# Patient Record
Sex: FEMALE | Race: WHITE | NOT HISPANIC OR LATINO | Employment: UNEMPLOYED | URBAN - METROPOLITAN AREA
[De-identification: names, ages, dates, MRNs, and addresses within clinical notes are randomized per-mention and may not be internally consistent; named-entity substitution may affect disease eponyms.]

---

## 2023-07-14 LAB — ALDOSTERONE IN SERUM: 5.9 NG/DL

## 2023-07-17 LAB
CORTISOL FREE, SERUM: 0.39 UG/DL
METANEPHRINE: 0.31 NMOL/L
NORMETANEPHRINE: 2 NMOL/L

## 2023-07-27 LAB — RENIN ACTIVITY: 11.1 NG/ML/HR

## 2023-09-02 PROBLEM — F41.9 ANXIETY DISORDER: Status: ACTIVE | Noted: 2023-09-02

## 2023-09-02 PROBLEM — G89.29 NECK PAIN, CHRONIC: Status: ACTIVE | Noted: 2023-09-02

## 2023-09-02 PROBLEM — G89.29 CHRONIC RIGHT SHOULDER PAIN: Status: ACTIVE | Noted: 2023-09-02

## 2023-09-02 PROBLEM — M48.00 SPINAL STENOSIS: Status: ACTIVE | Noted: 2023-09-02

## 2023-09-02 PROBLEM — M51.379 DEGENERATION OF LUMBOSACRAL INTERVERTEBRAL DISC: Status: ACTIVE | Noted: 2023-09-02

## 2023-09-02 PROBLEM — M79.10 MYALGIA: Status: ACTIVE | Noted: 2023-09-02

## 2023-09-02 PROBLEM — D35.00 ADRENAL ADENOMA: Status: ACTIVE | Noted: 2023-09-02

## 2023-09-02 PROBLEM — M51.37 DEGENERATION OF LUMBOSACRAL INTERVERTEBRAL DISC: Status: ACTIVE | Noted: 2023-09-02

## 2023-09-02 PROBLEM — M54.2 NECK PAIN, CHRONIC: Status: ACTIVE | Noted: 2023-09-02

## 2023-09-02 PROBLEM — I50.23 ACUTE ON CHRONIC LEFT SYSTOLIC HEART FAILURE (MULTI): Status: ACTIVE | Noted: 2023-09-02

## 2023-09-02 PROBLEM — E55.9 AVITAMINOSIS D: Status: ACTIVE | Noted: 2023-09-02

## 2023-09-02 PROBLEM — K59.09 CHRONIC CONSTIPATION: Status: ACTIVE | Noted: 2023-09-02

## 2023-09-02 PROBLEM — R00.2 PALPITATIONS: Status: ACTIVE | Noted: 2023-09-02

## 2023-09-02 PROBLEM — M25.512 CHRONIC LEFT SHOULDER PAIN: Status: ACTIVE | Noted: 2023-09-02

## 2023-09-02 PROBLEM — I10 BENIGN ESSENTIAL HYPERTENSION: Status: ACTIVE | Noted: 2023-09-02

## 2023-09-02 PROBLEM — I42.9 CARDIOMYOPATHY (MULTI): Status: ACTIVE | Noted: 2023-09-02

## 2023-09-02 PROBLEM — R79.89 ELEVATED PLASMA METANEPHRINES: Status: ACTIVE | Noted: 2023-09-02

## 2023-09-02 PROBLEM — M54.16 LUMBAR RADICULOPATHY: Status: ACTIVE | Noted: 2023-09-02

## 2023-09-02 PROBLEM — N93.8 DYSFUNCTIONAL UTERINE BLEEDING: Status: ACTIVE | Noted: 2023-09-02

## 2023-09-02 PROBLEM — M25.511 CHRONIC RIGHT SHOULDER PAIN: Status: ACTIVE | Noted: 2023-09-02

## 2023-09-02 PROBLEM — G43.709 MIGRAINE, CHRONIC, WITHOUT AURA: Status: ACTIVE | Noted: 2023-09-02

## 2023-09-02 PROBLEM — G89.29 CHRONIC PAIN: Status: ACTIVE | Noted: 2023-09-02

## 2023-09-02 PROBLEM — G47.30 APNEA, SLEEP: Status: ACTIVE | Noted: 2023-09-02

## 2023-09-02 PROBLEM — G89.29 CHRONIC LEFT SHOULDER PAIN: Status: ACTIVE | Noted: 2023-09-02

## 2023-09-02 PROBLEM — J42 CHRONIC BRONCHITIS (MULTI): Status: ACTIVE | Noted: 2023-09-02

## 2023-09-02 PROBLEM — F32.A DEPRESSION: Status: ACTIVE | Noted: 2023-09-02

## 2023-09-02 PROBLEM — M99.05 SEGMENTAL AND SOMATIC DYSFUNCTION OF PELVIC REGION: Status: ACTIVE | Noted: 2023-09-02

## 2023-09-02 RX ORDER — SACUBITRIL AND VALSARTAN 24; 26 MG/1; MG/1
1 TABLET, FILM COATED ORAL 2 TIMES DAILY
COMMUNITY
Start: 2022-04-19 | End: 2024-02-22 | Stop reason: SDUPTHER

## 2023-09-02 RX ORDER — IBUPROFEN 200 MG
1 TABLET ORAL DAILY
COMMUNITY
Start: 2022-04-21 | End: 2024-02-22 | Stop reason: WASHOUT

## 2023-09-02 RX ORDER — CARVEDILOL 6.25 MG/1
1 TABLET ORAL
COMMUNITY
Start: 2022-04-15 | End: 2024-02-09 | Stop reason: SDUPTHER

## 2023-09-02 RX ORDER — SPIRONOLACTONE 25 MG/1
25 TABLET ORAL 2 TIMES DAILY
COMMUNITY
End: 2024-02-22 | Stop reason: SDUPTHER

## 2023-10-10 ENCOUNTER — APPOINTMENT (OUTPATIENT)
Dept: CARDIOLOGY | Facility: CLINIC | Age: 48
End: 2023-10-10
Payer: COMMERCIAL

## 2023-11-23 PROBLEM — I50.22 CHRONIC SYSTOLIC (CONGESTIVE) HEART FAILURE (MULTI): Status: ACTIVE | Noted: 2023-09-02

## 2023-12-06 ENCOUNTER — HOSPITAL ENCOUNTER (EMERGENCY)
Facility: HOSPITAL | Age: 48
Discharge: LEFT BEFORE DISCHARGE | End: 2023-12-06
Payer: COMMERCIAL

## 2023-12-06 VITALS
HEART RATE: 94 BPM | HEIGHT: 62 IN | SYSTOLIC BLOOD PRESSURE: 111 MMHG | DIASTOLIC BLOOD PRESSURE: 80 MMHG | RESPIRATION RATE: 22 BRPM | WEIGHT: 131.84 LBS | TEMPERATURE: 97.2 F | OXYGEN SATURATION: 97 % | BODY MASS INDEX: 24.26 KG/M2

## 2023-12-06 PROCEDURE — 4500999001 HC ED NO CHARGE

## 2023-12-06 PROCEDURE — 99281 EMR DPT VST MAYX REQ PHY/QHP: CPT

## 2023-12-06 ASSESSMENT — LIFESTYLE VARIABLES
HAVE YOU EVER FELT YOU SHOULD CUT DOWN ON YOUR DRINKING: NO
EVER HAD A DRINK FIRST THING IN THE MORNING TO STEADY YOUR NERVES TO GET RID OF A HANGOVER: NO
REASON UNABLE TO ASSESS: NO
EVER FELT BAD OR GUILTY ABOUT YOUR DRINKING: NO
HAVE PEOPLE ANNOYED YOU BY CRITICIZING YOUR DRINKING: NO

## 2023-12-06 ASSESSMENT — COLUMBIA-SUICIDE SEVERITY RATING SCALE - C-SSRS
1. IN THE PAST MONTH, HAVE YOU WISHED YOU WERE DEAD OR WISHED YOU COULD GO TO SLEEP AND NOT WAKE UP?: NO
6. HAVE YOU EVER DONE ANYTHING, STARTED TO DO ANYTHING, OR PREPARED TO DO ANYTHING TO END YOUR LIFE?: NO
2. HAVE YOU ACTUALLY HAD ANY THOUGHTS OF KILLING YOURSELF?: NO

## 2023-12-06 ASSESSMENT — PAIN SCALES - GENERAL: PAINLEVEL_OUTOF10: 0 - NO PAIN

## 2023-12-06 ASSESSMENT — PAIN - FUNCTIONAL ASSESSMENT: PAIN_FUNCTIONAL_ASSESSMENT: 0-10

## 2023-12-06 NOTE — ED TRIAGE NOTES
Pt to ER with c/o low blood pressure, and feeling like her left leg has water in it. Pt has hx of CHF.

## 2024-02-09 DIAGNOSIS — I42.9 CARDIOMYOPATHY, UNSPECIFIED TYPE (MULTI): Primary | ICD-10-CM

## 2024-02-09 RX ORDER — CARVEDILOL 6.25 MG/1
6.25 TABLET ORAL
Qty: 60 TABLET | Refills: 0 | Status: SHIPPED | OUTPATIENT
Start: 2024-02-09 | End: 2024-02-22 | Stop reason: SDUPTHER

## 2024-02-21 NOTE — PROGRESS NOTES
"Counseling:  The patient was counseled regarding diagnostic results, instructions for management, risk factor reductions, prognosis, patient and family education, impressions, risks and benefits of treatment options and importance of compliance with treatment.      Chief Complaint:   The patient presents today for 7-month followup of heart failure, HTN and syncope.      History Of Present Illness:    Zoila May is a 48 year old female patient who presents today for 7-month followup of heart failure, HTN and syncope. Her PMH is significant for systolic heart failure, anxiety, sleep apnea, depression, tobacco abuse and spinal stenosis. Over the past 7 months, the patient states that she has done relatively well from a cardiac standpoint. She denies any CP or discomfort. She reports exertional SOB, especially with stairs. BP has been stable. The patient is compliant with her prescribed medications. Unfortunately, the patient continues to smoke.      Last Recorded Vitals:  Vitals:    02/22/24 1138   BP: 126/86   Pulse: 105   Weight: 63 kg (139 lb)   Height: 1.575 m (5' 2\")       Past Surgical History:  She has a past surgical history that includes Other surgical history (09/30/2015).      Social History:  She reports that she has been smoking cigarettes. She has been exposed to tobacco smoke. She has never used smokeless tobacco. No history on file for alcohol use and drug use.    Family History:  Family History   Problem Relation Name Age of Onset    Cancer Mother      Cancer Father          Allergies:  Patient has no known allergies.    Outpatient Medications:  Current Outpatient Medications   Medication Instructions    carvedilol (COREG) 6.25 mg, oral, 2 times daily with meals    cyclobenzaprine (FLEXERIL) 10 mg, oral, 3 times daily PRN    empagliflozin (Jardiance) 25 mg 1 tablet, oral, Daily    furosemide (LASIX) 20 mg, oral, Daily    nicotine (Nicoderm CQ) 14 mg/24 hr patch 1 patch, transdermal, Daily, As " directed.     sacubitriL-valsartan (Entresto) 24-26 mg tablet 1 tablet, oral, 2 times daily    spironolactone (ALDACTONE) 25 mg, oral, 2 times daily     Review of Systems   Cardiovascular:  Positive for dyspnea on exertion.   All other systems reviewed and are negative.     Physical Exam:  Constitutional:       Appearance: Healthy appearance. Not in distress.   Neck:      Vascular: No JVR. JVD normal.   Pulmonary:      Effort: Pulmonary effort is normal.      Breath sounds: Normal breath sounds. No wheezing. No rhonchi. No rales.   Chest:      Chest wall: Not tender to palpatation.   Cardiovascular:      PMI at left midclavicular line. Normal rate. Regular rhythm. Normal S1. Normal S2.       Murmurs: There is no murmur.      No gallop.  No click. No rub.   Pulses:     Intact distal pulses.   Edema:     Peripheral edema absent.   Abdominal:      General: Bowel sounds are normal.      Palpations: Abdomen is soft.      Tenderness: There is no abdominal tenderness.   Musculoskeletal: Normal range of motion.         General: No tenderness. Skin:     General: Skin is warm and dry.   Neurological:      General: No focal deficit present.      Mental Status: Alert and oriented to person, place and time.          Last Labs:  CBC -  Lab Results   Component Value Date    WBC 11.8 (H) 06/26/2023    HGB 14.6 06/26/2023    HCT 43.9 06/26/2023    MCV 87 06/26/2023     06/26/2023       CMP -  Lab Results   Component Value Date    CALCIUM 9.1 06/26/2023    PROT 6.8 06/26/2023    ALBUMIN 4.2 06/26/2023    AST 10 06/26/2023    ALT 7 06/26/2023    ALKPHOS 68 06/26/2023    BILITOT 0.2 06/26/2023       RENAL FUNCTION PANEL -   Lab Results   Component Value Date    GLUCOSE 100 (H) 06/26/2023     06/26/2023    K 3.2 (L) 06/26/2023     06/26/2023    CO2 22 06/26/2023    ANIONGAP 12 06/26/2023    BUN 5 (L) 06/26/2023    CREATININE 0.57 06/26/2023    CALCIUM 9.1 06/26/2023    ALBUMIN 4.2 06/26/2023        Lab Results    Component Value Date    BNP 11 06/26/2023       Last Cardiology Tests:  07/11/2023 - Vascular Lab Renal Artery U/S   1. Right Renal Artery: Right renal arteries demonstrate no evidence of hemodynamically significant stenosis. The right renal vein is widely patent.  2. Left Renal Artery: Left renal arteries demonstrate no evidence of hemodynamically significant stenosis. The left renal vein is widely patent.  3. Additional Findings: Technically limited due to bowel gas and patients respirations.     10/06/2022 - TTE  Left ventricular systolic function is normal with a 60-65% estimated ejection fraction.     04/14/2022 - Cardiac Catheterization (LH/RH)  1. Non-obstructive coronary artery disease.  2. Right dominant coronary circulation.  3. Reduced CO/Cardiac index (Cynthia) with normal right and left sided filling pressures.     04/13/2022 - TTE  1. The left ventricular systolic function is severely decreased with a 25% estimated ejection fraction.  2. Spectral Doppler shows an abnormal pattern of left ventricular diastolic filling.  3. The right atrium is mild to moderately dilated.  4. Mild to moderate mitral valve regurgitation.  5. There is moderate to severe tricuspid regurgitation.  6. There is global hypokinesis of the left ventricle with minor regional variations.     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  TTE Oct 2022 with normalization of LVEF 60-65%  [facility] April 2022 with non-obstructive CAD  Advised on adequate daily hydration  Denies CP or discomfort  Reports exertional SOB, especially with stairs  Smoking cessation  Check CXR  Check echo  Check CBC, CMP, BNP     2) HTN  Stable  Continue carvedilol 6.25 mg BID, Entresto 24-26 mg BID, furosemide 20 mg daily,  spironolactone 25 mg daily   ED evaluation 06/25/2023 for elevated home BP of 150s/100s with associated headache  EKG showed NSR at a rate of 95 bpm with no acute changes and BP was measured at 134/92  Renal artery U/S 07/11/2023 negative  Hormonal profile drawn 07/11/2023 with elevated normetanephrine of 2.0, otherwise unremarkable      3) Syncope  Reviewed Holter  Denies recurrent syncope     4) Elevated WBC  CBC drawn 06/2023 with elevated WBC count of 11.8  Patient denies any infectious symptoms  Referral previously placed to Dr. JAXSON Johnson to establish PCP care  New referral placed to establish PCP care - Dr. Mckenzie     5) Tobacco Dependence  Discussed smoking cessation face to face and explained detrimental effects to the patients health including worsening lung disease, HTN and CAD. Educated on benefits on quitting smoking including reducing risk of CAD, lung disease, peptic ulcers, cancer and osteoporosis. Educated on available options to help in quitting including nicotine patch, Nicorette gum/lozenges, Chantix and Wellbutrin. Counseling time spent on smoking cessation was 5 minutes.   -Reports exertional SOB  Check CXR        Scribe Attestation  By signing my name below, I, Ying Murillo   attest that this documentation has been prepared under the direction and in the presence of Gasper Pineda MD.

## 2024-02-22 ENCOUNTER — OFFICE VISIT (OUTPATIENT)
Dept: CARDIOLOGY | Facility: CLINIC | Age: 49
End: 2024-02-22
Payer: COMMERCIAL

## 2024-02-22 VITALS
HEART RATE: 105 BPM | DIASTOLIC BLOOD PRESSURE: 86 MMHG | SYSTOLIC BLOOD PRESSURE: 126 MMHG | HEIGHT: 62 IN | WEIGHT: 139 LBS | BODY MASS INDEX: 25.58 KG/M2

## 2024-02-22 DIAGNOSIS — I42.9 CARDIOMYOPATHY, UNSPECIFIED TYPE (MULTI): ICD-10-CM

## 2024-02-22 DIAGNOSIS — I50.22 CHRONIC SYSTOLIC (CONGESTIVE) HEART FAILURE (MULTI): ICD-10-CM

## 2024-02-22 DIAGNOSIS — I42.0 DILATED CARDIOMYOPATHY (MULTI): Primary | ICD-10-CM

## 2024-02-22 PROCEDURE — 3074F SYST BP LT 130 MM HG: CPT | Performed by: INTERNAL MEDICINE

## 2024-02-22 PROCEDURE — 3079F DIAST BP 80-89 MM HG: CPT | Performed by: INTERNAL MEDICINE

## 2024-02-22 PROCEDURE — 99213 OFFICE O/P EST LOW 20 MIN: CPT | Performed by: INTERNAL MEDICINE

## 2024-02-22 PROCEDURE — 4004F PT TOBACCO SCREEN RCVD TLK: CPT | Performed by: INTERNAL MEDICINE

## 2024-02-22 RX ORDER — CARVEDILOL 6.25 MG/1
6.25 TABLET ORAL
Qty: 180 TABLET | Refills: 3 | Status: SHIPPED | OUTPATIENT
Start: 2024-02-22 | End: 2025-02-21

## 2024-02-22 RX ORDER — SPIRONOLACTONE 25 MG/1
25 TABLET ORAL 2 TIMES DAILY
Qty: 180 TABLET | Refills: 3 | Status: SHIPPED | OUTPATIENT
Start: 2024-02-22 | End: 2025-02-21

## 2024-02-22 RX ORDER — SACUBITRIL AND VALSARTAN 24; 26 MG/1; MG/1
1 TABLET, FILM COATED ORAL 2 TIMES DAILY
Qty: 180 TABLET | Refills: 3 | Status: SHIPPED | OUTPATIENT
Start: 2024-02-22 | End: 2025-02-21

## 2024-02-22 RX ORDER — FUROSEMIDE 20 MG/1
20 TABLET ORAL DAILY
Qty: 90 TABLET | Refills: 3 | Status: SHIPPED | OUTPATIENT
Start: 2024-02-22 | End: 2025-02-21

## 2024-02-22 ASSESSMENT — ENCOUNTER SYMPTOMS: DYSPNEA ON EXERTION: 1

## 2024-02-22 NOTE — PATIENT INSTRUCTIONS
Continue all current medications as prescribed. Refills have been sent to your pharmacy.  Dr. Pineda has ordered a chest x-ray.  Dr. Pineda has also ordered an echocardiogram (ultrasound of the heart) to followup on your heart function and structure.   Please have blood work drawn.  Smoking cessation is strongly encouraged.  A new referral has been placed for you to get established with a primary care provider (Dr. Mckenzie).  Followup with Dr. Pineda after the above tests.    If you have any questions or cardiac concerns, please call our office at [phone].

## 2024-03-06 ENCOUNTER — HOSPITAL ENCOUNTER (OUTPATIENT)
Dept: CARDIOLOGY | Facility: HOSPITAL | Age: 49
Discharge: HOME | End: 2024-03-06
Payer: COMMERCIAL

## 2024-03-06 DIAGNOSIS — I42.0 DILATED CARDIOMYOPATHY (MULTI): ICD-10-CM

## 2024-03-06 DIAGNOSIS — R06.02 SHORTNESS OF BREATH: ICD-10-CM

## 2024-03-06 DIAGNOSIS — I50.23 ACUTE ON CHRONIC SYSTOLIC (CONGESTIVE) HEART FAILURE (MULTI): ICD-10-CM

## 2024-03-06 DIAGNOSIS — I50.22 CHRONIC SYSTOLIC (CONGESTIVE) HEART FAILURE (MULTI): ICD-10-CM

## 2024-03-06 LAB
EJECTION FRACTION APICAL 4 CHAMBER: 51
EJECTION FRACTION: 61 %
LEFT ATRIUM VOLUME AREA LENGTH INDEX BSA: 11.3 ML/M2
LEFT VENTRICLE INTERNAL DIMENSION DIASTOLE: 3.88 CM (ref 3.5–6)
LEFT VENTRICULAR OUTFLOW TRACT DIAMETER: 1.96 CM
MITRAL VALVE E/A RATIO: 0.99
MITRAL VALVE E/E' RATIO: 7.69
RIGHT VENTRICLE FREE WALL PEAK S': 10.88 CM/S
TRICUSPID ANNULAR PLANE SYSTOLIC EXCURSION: 2 CM

## 2024-03-06 PROCEDURE — 93306 TTE W/DOPPLER COMPLETE: CPT | Performed by: INTERNAL MEDICINE

## 2024-03-06 PROCEDURE — 93306 TTE W/DOPPLER COMPLETE: CPT

## 2024-03-14 PROBLEM — M25.559 ARTHRALGIA OF HIP: Status: ACTIVE | Noted: 2024-03-14

## 2024-03-14 PROBLEM — S69.90XA THUMB INJURY: Status: ACTIVE | Noted: 2024-03-14

## 2024-03-14 PROBLEM — E55.9 VITAMIN D DEFICIENCY: Status: ACTIVE | Noted: 2023-09-02

## 2024-03-14 PROBLEM — I50.9 CONGESTIVE HEART FAILURE (MULTI): Status: ACTIVE | Noted: 2022-10-06

## 2024-03-14 PROBLEM — S01.95XA: Status: ACTIVE | Noted: 2024-03-14

## 2024-03-14 PROBLEM — R93.89 ABNORMAL RADIOGRAPHIC EXAMINATION: Status: ACTIVE | Noted: 2024-03-14
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
## 2024-03-14 PROBLEM — M79.10 MUSCLE PAIN: Status: ACTIVE | Noted: 2023-09-02

## 2024-03-14 PROBLEM — R14.0 ABDOMINAL BLOATING: Status: ACTIVE | Noted: 2024-03-14

## 2024-03-14 PROBLEM — M54.9 CHRONIC BACK PAIN: Status: ACTIVE | Noted: 2024-03-14

## 2024-03-14 PROBLEM — G89.29 CHRONIC BACK PAIN: Status: ACTIVE | Noted: 2024-03-14

## 2024-04-02 ENCOUNTER — OFFICE VISIT (OUTPATIENT)
Dept: CARDIOLOGY | Facility: CLINIC | Age: 49
End: 2024-04-02
Payer: COMMERCIAL

## 2024-04-02 VITALS — DIASTOLIC BLOOD PRESSURE: 80 MMHG | HEART RATE: 60 BPM | SYSTOLIC BLOOD PRESSURE: 120 MMHG

## 2024-04-02 DIAGNOSIS — I42.0 DILATED CARDIOMYOPATHY (MULTI): Primary | ICD-10-CM

## 2024-04-02 PROCEDURE — 99213 OFFICE O/P EST LOW 20 MIN: CPT | Performed by: INTERNAL MEDICINE

## 2024-04-02 PROCEDURE — 3079F DIAST BP 80-89 MM HG: CPT | Performed by: INTERNAL MEDICINE

## 2024-04-02 PROCEDURE — 3074F SYST BP LT 130 MM HG: CPT | Performed by: INTERNAL MEDICINE

## 2024-04-02 ASSESSMENT — ENCOUNTER SYMPTOMS: DYSPNEA ON EXERTION: 1

## 2024-04-02 NOTE — PROGRESS NOTES
Counseling:  The patient was counseled regarding diagnostic results, instructions for management, risk factor reductions, prognosis, patient and family education, impressions, risks and benefits of treatment options and importance of compliance with treatment.      Chief Complaint:   The patient presents today for 6-week followup of exertional SOB s/p echocardiogram.     History Of Present Illness:    Zoila May is a 49 year old female patient who presents today for 6-week followup of exertional SOB s/p echocardiogram. Her PMH is significant for systolic heart failure, anxiety, sleep apnea, depression, tobacco abuse and spinal stenosis. Echocardiogram performed 03/06/202 demonstrated an EF of 55-60%. Today, the patient states that she is feeling relatively well with no new cardiac complaints. She unfortunately has not had the CXR or labs done that were ordered at last office visit.      Last Recorded Vitals:  Vitals:    04/02/24 1214   BP: 120/80       Past Surgical History:  She has a past surgical history that includes Other surgical history (09/30/2015).      Social History:  She reports that she has been smoking cigarettes. She has been exposed to tobacco smoke. She has never used smokeless tobacco. No history on file for alcohol use and drug use.    Family History:  Family History   Problem Relation Name Age of Onset    Cancer Mother      Cancer Father          Allergies:  Patient has no known allergies.    Outpatient Medications:  Current Outpatient Medications   Medication Instructions    albuterol (Ventolin HFA) 90 mcg/actuation inhaler inhalation    carvedilol (COREG) 6.25 mg, oral, 2 times daily with meals    cyclobenzaprine (FLEXERIL) 10 mg, oral, 3 times daily PRN    dicyclomine (BENTYL) 10 mg, oral    docusate sodium (COLACE) 100 mg, oral, Every 12 hours    empagliflozin (JARDIANCE) 25 mg, oral, Daily    fluticasone furoate-vilanteroL (Breo Ellipta) 100-25 mcg/dose inhaler inhalation    furosemide  (LASIX) 20 mg, oral, Daily    gabapentin (NEURONTIN) 800 mg, oral    L. acidophilus/Bifid. animalis 31 billion cell capsule oral    meloxicam (MOBIC) 15 mg, oral    naloxone (NARCAN) 1 mg, nasal    sacubitriL-valsartan (Entresto) 24-26 mg tablet 1 tablet, oral, 2 times daily    spironolactone (ALDACTONE) 25 mg, oral, 2 times daily    tiZANidine (ZANAFLEX) 4 mg, oral     Review of Systems   Cardiovascular:  Positive for dyspnea on exertion.   All other systems reviewed and are negative.     Physical Exam:  Constitutional:       Appearance: Healthy appearance. Not in distress.   Neck:      Vascular: No JVR. JVD normal.   Pulmonary:      Effort: Pulmonary effort is normal.      Breath sounds: Normal breath sounds. No wheezing. No rhonchi. No rales.   Chest:      Chest wall: Not tender to palpatation.   Cardiovascular:      PMI at left midclavicular line. Normal rate. Regular rhythm. Normal S1. Normal S2.       Murmurs: There is no murmur.      No gallop.  No click. No rub.   Pulses:     Intact distal pulses.   Edema:     Peripheral edema absent.   Abdominal:      General: Bowel sounds are normal.      Palpations: Abdomen is soft.      Tenderness: There is no abdominal tenderness.   Musculoskeletal: Normal range of motion.         General: No tenderness. Skin:     General: Skin is warm and dry.   Neurological:      General: No focal deficit present.      Mental Status: Alert and oriented to person, place and time.          Last Labs:  CBC -  Lab Results   Component Value Date    WBC 11.8 (H) 06/26/2023    HGB 14.6 06/26/2023    HCT 43.9 06/26/2023    MCV 87 06/26/2023     06/26/2023       CMP -  Lab Results   Component Value Date    CALCIUM 9.1 06/26/2023    PROT 6.8 06/26/2023    ALBUMIN 4.2 06/26/2023    AST 10 06/26/2023    ALT 7 06/26/2023    ALKPHOS 68 06/26/2023    BILITOT 0.2 06/26/2023       RENAL FUNCTION PANEL -   Lab Results   Component Value Date    GLUCOSE 100 (H) 06/26/2023     06/26/2023    K  3.2 (L) 06/26/2023     06/26/2023    CO2 22 06/26/2023    ANIONGAP 12 06/26/2023    BUN 5 (L) 06/26/2023    CREATININE 0.57 06/26/2023    CALCIUM 9.1 06/26/2023    ALBUMIN 4.2 06/26/2023        Lab Results   Component Value Date    BNP 11 06/26/2023       Last Cardiology Tests:  03/06/2024 - TTE  Left ventricular systolic function is normal with a 55-60% estimated ejection fraction.    07/11/2023 - Vascular Lab Renal Artery U/S   1. Right Renal Artery: Right renal arteries demonstrate no evidence of hemodynamically significant stenosis. The right renal vein is widely patent.  2. Left Renal Artery: Left renal arteries demonstrate no evidence of hemodynamically significant stenosis. The left renal vein is widely patent.  3. Additional Findings: Technically limited due to bowel gas and patients respirations.     10/06/2022 - TTE  Left ventricular systolic function is normal with a 60-65% estimated ejection fraction.     04/14/2022 - Cardiac Catheterization (LH/RH)  1. Non-obstructive coronary artery disease.  2. Right dominant coronary circulation.  3. Reduced CO/Cardiac index (Cynthia) with normal right and left sided filling pressures.     04/13/2022 - TTE  1. The left ventricular systolic function is severely decreased with a 25% estimated ejection fraction.  2. Spectral Doppler shows an abnormal pattern of left ventricular diastolic filling.  3. The right atrium is mild to moderately dilated.  4. Mild to moderate mitral valve regurgitation.  5. There is moderate to severe tricuspid regurgitation.  6. There is global hypokinesis of the left ventricle with minor regional variations.     Diagnostic review: I have personally reviewed the result(s) of the Echocardiogram.     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  [facility] April 2022 with non-obstructive CAD  Advised on adequate daily hydration  Denies CP or discomfort  Reports exertional SOB, especially with stairs  Smoking cessation  Check CXR - not completed  TTE 03/06/2024 with LVEF 55-60%  Advised to have CXR and labs done that were ordered at last office visit   Followup with Mary Carlos NP, in 6 months     2) HTN  Stable  Continue carvedilol 6.25 mg BID, Entresto 24-26 mg BID, furosemide 20 mg daily, spironolactone 25 mg daily   ED evaluation 06/25/2023 for elevated home BP of 150s/100s with associated headache  EKG showed NSR at a rate of 95 bpm with no acute changes and BP was measured at 134/92  Renal artery U/S 07/11/2023 negative  Hormonal profile drawn 07/11/2023 with elevated normetanephrine of 2.0, otherwise unremarkable   Followup with Mary Carlos NP, in 6 months     3) Syncope  Reviewed Holter  Denies recurrent syncope     4) Elevated WBC  CBC drawn 06/2023 with elevated WBC count of 11.8  Patient denies any infectious symptoms  Referral previously placed to Dr. JAXSON Johnson to establish PCP care  New referral placed to establish PCP care - Dr. Mckenzie     5) Tobacco Dependence  Discussed smoking cessation face to face and explained detrimental effects to the patients health including worsening lung disease, HTN and CAD. Educated on benefits on quitting smoking including reducing risk of CAD, lung disease, peptic ulcers, cancer and osteoporosis. Educated on available options to help in quitting including nicotine patch, Nicorette gum/lozenges, Chantix and Wellbutrin. Counseling time spent on smoking cessation was 5 minutes.   -Reports exertional SOB  -Check CXR - not completed  -Advised to have CXR done        Scribe Attestation  By signing my name below, I, Ying Murillo   attest that this documentation has been prepared under the direction and in the presence of Gasper Pineda MD.

## 2024-04-02 NOTE — PATIENT INSTRUCTIONS
Continue all current medications as prescribed.   Please have the blood work and chest x-ray done. You will be notified of the results once they become available.  Followup with Mary Carlos NP, in 6 months.      If you have any questions or cardiac concerns, please call our office at [phone].

## 2024-04-17 ENCOUNTER — TELEPHONE (OUTPATIENT)
Dept: CARDIOLOGY | Facility: CLINIC | Age: 49
End: 2024-04-17
Payer: COMMERCIAL

## 2024-04-17 NOTE — TELEPHONE ENCOUNTER
Left Zoila PINK letting her know that her appt scheduled with Dr. Mckenzie was made in error and is canceled. I let her know she could contact me at our office to help her find a primary provider if needed. I will contact her in the AM again if I do not hear from her before leaving this evening.

## 2024-04-18 ENCOUNTER — APPOINTMENT (OUTPATIENT)
Dept: PRIMARY CARE | Facility: CLINIC | Age: 49
End: 2024-04-18
Payer: COMMERCIAL

## 2024-09-26 ASSESSMENT — ENCOUNTER SYMPTOMS
VOMITING: 0
SYNCOPE: 0
SHORTNESS OF BREATH: 0
HEMATURIA: 0
DYSPNEA ON EXERTION: 1
WHEEZING: 0
HEMATOCHEZIA: 0
PALPITATIONS: 0
ALTERED MENTAL STATUS: 0
COUGH: 0
ORTHOPNEA: 0
FEVER: 0
IRREGULAR HEARTBEAT: 0
CHILLS: 0
NAUSEA: 0
NEAR-SYNCOPE: 0

## 2024-09-26 NOTE — PROGRESS NOTES
Chief Complaint/Reason for Visit:  Follow-up (6 month) 6 month cardiovascular follow up    History Of Present Illness:    Zoila May is a 49 y.o. female that presents to the office for 6 month follow up.    Taking medications as prescribed.     PMH is significant for systolic heart failure, anxiety, sleep apnea, depression, tobacco abuse and spinal stenosis. Current smoker.    EKG personally reviewed today showed SR with HR 91 bpm.  This will go to the cardiologist for final review.     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 She continues to smoke.    Past Medical History:  She has a past medical history of Dorsalgia, unspecified.    Past Surgical History:  She has a past surgical history that includes Other surgical history (09/30/2015).      Social History:  She reports that she has been smoking cigarettes. She has been exposed to tobacco smoke. She has never used smokeless tobacco. No history on file for alcohol use and drug use.    Family History:  Family History   Problem Relation Name Age of Onset    Cancer Mother      Cancer Father          Allergies:  Patient has no known allergies.    Review of Systems   Constitutional: Negative for chills and fever.   Cardiovascular:  Positive for dyspnea on exertion (chronic). Negative for chest pain, irregular heartbeat, leg swelling, near-syncope, orthopnea, palpitations and syncope.   Respiratory:  Negative for cough, shortness of breath and wheezing.    Gastrointestinal:  Negative for hematochezia, melena, nausea and vomiting.   Genitourinary:  Negative for hematuria.   Psychiatric/Behavioral:  Negative for altered mental status.        Objective      Vitals reviewed.   Constitutional:       Appearance: Not in distress.   Neck:      Vascular: No carotid bruit.   Pulmonary:      Effort: Pulmonary effort is normal.      Breath sounds: Normal breath sounds.   Cardiovascular:      PMI at left midclavicular line.  "Normal rate. Regular rhythm. S1 with normal intensity. S2 with normal intensity.       Murmurs: There is no murmur.   Edema:     Peripheral edema absent.   Abdominal:      General: Bowel sounds are normal.   Neurological:      Mental Status: Alert and oriented to person, place and time.         Current Outpatient Medications   Medication Instructions    carvedilol (COREG) 6.25 mg, oral, 2 times daily (morning and late afternoon)    empagliflozin (JARDIANCE) 25 mg, oral, Daily    furosemide (LASIX) 20 mg, oral, Daily    sacubitriL-valsartan (Entresto) 24-26 mg tablet 1 tablet, oral, 2 times daily    spironolactone (ALDACTONE) 25 mg, oral, 2 times daily        Last Labs:  CBC -  Lab Results   Component Value Date    WBC 11.8 (H) 06/26/2023    HGB 14.6 06/26/2023    HCT 43.9 06/26/2023    MCV 87 06/26/2023     06/26/2023       RENAL FUNCTION PANEL -   Lab Results   Component Value Date    GLUCOSE 100 (H) 06/26/2023     06/26/2023    K 3.2 (L) 06/26/2023     06/26/2023    CO2 22 06/26/2023    ANIONGAP 12 06/26/2023    BUN 5 (L) 06/26/2023    CREATININE 0.57 06/26/2023    CALCIUM 9.1 06/26/2023    ALBUMIN 4.2 06/26/2023        CMP -  Lab Results   Component Value Date    CALCIUM 9.1 06/26/2023    PROT 6.8 06/26/2023    ALBUMIN 4.2 06/26/2023    AST 10 06/26/2023    ALT 7 06/26/2023    ALKPHOS 68 06/26/2023    BILITOT 0.2 06/26/2023       LIPID PANEL -   No results found for: \"CHOL\", \"TRIG\", \"HDL\", \"CHHDL\", \"LDLF\", \"VLDL\", \"NHDL\"  No results found for: \"LDLCALC\"    Lab Results   Component Value Date    BNP 11 06/26/2023       Lab Results   Component Value Date    TSH 2.03 04/13/2022       No results found for this or any previous visit.     Last Cardiology Tests:    Echo 3/6/24:   1. Left ventricular systolic function is normal with a 55-60% estimated ejection fraction.     Renal artery duplex 7/12/23:   Right Renal Artery: Right renal arteries demonstrate no evidence of hemodynamically significant " "stenosis. The right renal vein is widely patent.  Left Renal Artery: Left renal arteries demonstrate no evidence of hemodynamically significant stenosis. The left renal vein is widely patent.  Additional Findings:  Technically limited due to bowel gas and patients respirations.    Echocardiogram performed 10/06/2022 demonstrated LV systolic function is normal with an EF of 60-65%.      [facility] 4/14/22 showed non-obstructive CAD. Reduced CO/CI (Cynthia) with normal right and left sided filling pressures.     Visit Vitals  /88 (BP Location: Left arm)   Pulse 91   Ht 1.575 m (5' 2\")   Wt 66.2 kg (146 lb)   BMI 26.70 kg/m²   OB Status Having periods   Smoking Status Every Day   BSA 1.7 m²       Assessment/Plan   The primary encounter diagnosis was Chronic systolic (congestive) heart failure. Diagnoses of Benign essential hypertension and Dilated cardiomyopathy (Multi) were also pertinent to this visit.    1. HFimpEF; nonischemic CMP (? Viral induced cardiomyopathy d/t Coxsackie virus)  Not volume overloaded on exam  Continue current medications which include:  Beta blocker: Carvedilol 6.25 mg BID  ACE inhibitor/ARB/ARNI: Sacubitril-valsartan 24-26 mg BID  MRA:  spironolactone 25 mg BID  SGLT2i:   empagliflozin 25 mg daily  Diuretic: Furosemide 20 mg daily  Hydralazine/Nitrate: None  Device: None  [facility] April 2022 with non-obstructive CAD.  TTE Oct 2022 with normalization of LVEF 60-65%  TTE March 2024 with LVEF 55-60%  Check CMP, BNP and CBC as previously ordered by Dr. Gasper Pineda.  Needs refills on spironolactone after lab results - 30 days only sent today    2. HTN   Stable  Continue current antihypertensives: carvedilol 6.25 mg BID, Entresto 24-26 mg BID, furosemide 20 mg daily and spironolactone 25 mg BID   Check CMP, BNP and CBC as ordered by Dr. Gasper Pineda     3. Nicotine dependence, SOB  TTE March 2024 with LVEF 55-60%  [facility] April 2022 with mild nonobstructive CAD  She reports she has smoked 1 PPD for 30+ years.  " She is unsure of the exact age she started smoking, but did say that it was before she was 17 years old.  Recommend complete smoking cessation  Refer to pulmonology    Mary Carlos, APRN-CNP

## 2024-09-26 NOTE — PATIENT INSTRUCTIONS
Recommend Mediterranean style of eating  Continue current medications  Check fasting lab work as ordered by Dr. Gasper Pineda -  need done in the next 1-2 weeks!  I have ordered a referral to pulmonology regarding shortness of breath  Work on quitting smoking  Follow-up with Dr. Pineda in 6 months  If you have any questions or cardiac concerns, please call our office at [phone].

## 2024-09-27 ENCOUNTER — TELEPHONE (OUTPATIENT)
Dept: CARDIOLOGY | Facility: HOSPITAL | Age: 49
End: 2024-09-27
Payer: COMMERCIAL

## 2024-10-02 ENCOUNTER — APPOINTMENT (OUTPATIENT)
Dept: CARDIOLOGY | Facility: CLINIC | Age: 49
End: 2024-10-02
Payer: COMMERCIAL

## 2024-10-02 VITALS
HEART RATE: 91 BPM | WEIGHT: 146 LBS | DIASTOLIC BLOOD PRESSURE: 88 MMHG | SYSTOLIC BLOOD PRESSURE: 118 MMHG | HEIGHT: 62 IN | BODY MASS INDEX: 26.87 KG/M2

## 2024-10-02 DIAGNOSIS — R06.02 SHORTNESS OF BREATH: ICD-10-CM

## 2024-10-02 DIAGNOSIS — F17.200 NICOTINE DEPENDENCE, UNCOMPLICATED, UNSPECIFIED NICOTINE PRODUCT TYPE: ICD-10-CM

## 2024-10-02 DIAGNOSIS — I10 BENIGN ESSENTIAL HYPERTENSION: ICD-10-CM

## 2024-10-02 DIAGNOSIS — I50.32 HEART FAILURE WITH IMPROVED EJECTION FRACTION (HFIMPEF): ICD-10-CM

## 2024-10-02 DIAGNOSIS — I42.0 DILATED CARDIOMYOPATHY (MULTI): ICD-10-CM

## 2024-10-02 DIAGNOSIS — I50.22 CHRONIC SYSTOLIC (CONGESTIVE) HEART FAILURE: Primary | ICD-10-CM

## 2024-10-02 DIAGNOSIS — I42.9 CARDIOMYOPATHY, UNSPECIFIED TYPE (MULTI): ICD-10-CM

## 2024-10-02 PROCEDURE — 99214 OFFICE O/P EST MOD 30 MIN: CPT | Performed by: NURSE PRACTITIONER

## 2024-10-02 PROCEDURE — 93010 ELECTROCARDIOGRAM REPORT: CPT | Performed by: INTERNAL MEDICINE

## 2024-10-02 PROCEDURE — 3074F SYST BP LT 130 MM HG: CPT | Performed by: NURSE PRACTITIONER

## 2024-10-02 PROCEDURE — 3079F DIAST BP 80-89 MM HG: CPT | Performed by: NURSE PRACTITIONER

## 2024-10-02 PROCEDURE — 3008F BODY MASS INDEX DOCD: CPT | Performed by: NURSE PRACTITIONER

## 2024-10-02 PROCEDURE — 4004F PT TOBACCO SCREEN RCVD TLK: CPT | Performed by: NURSE PRACTITIONER

## 2024-10-02 PROCEDURE — 93005 ELECTROCARDIOGRAM TRACING: CPT | Performed by: NURSE PRACTITIONER

## 2024-10-02 RX ORDER — SPIRONOLACTONE 25 MG/1
25 TABLET ORAL 2 TIMES DAILY
Qty: 60 TABLET | Refills: 0 | Status: SHIPPED | OUTPATIENT
Start: 2024-10-02 | End: 2024-11-01

## 2024-11-04 ENCOUNTER — LAB (OUTPATIENT)
Dept: LAB | Facility: LAB | Age: 49
End: 2024-11-04
Payer: COMMERCIAL

## 2024-11-04 DIAGNOSIS — I42.0 DILATED CARDIOMYOPATHY (MULTI): ICD-10-CM

## 2024-11-04 DIAGNOSIS — I50.22 CHRONIC SYSTOLIC (CONGESTIVE) HEART FAILURE: ICD-10-CM

## 2024-11-04 LAB
ALBUMIN SERPL BCP-MCNC: 4 G/DL (ref 3.4–5)
ALP SERPL-CCNC: 62 U/L (ref 33–110)
ALT SERPL W P-5'-P-CCNC: 9 U/L (ref 7–45)
ANION GAP SERPL CALC-SCNC: 13 MMOL/L (ref 10–20)
AST SERPL W P-5'-P-CCNC: 9 U/L (ref 9–39)
BASOPHILS # BLD AUTO: 0.05 X10*3/UL (ref 0–0.1)
BASOPHILS NFR BLD AUTO: 0.4 %
BILIRUB SERPL-MCNC: 0.3 MG/DL (ref 0–1.2)
BNP SERPL-MCNC: 17 PG/ML (ref 0–99)
BUN SERPL-MCNC: 8 MG/DL (ref 6–23)
CALCIUM SERPL-MCNC: 8.7 MG/DL (ref 8.6–10.3)
CHLORIDE SERPL-SCNC: 104 MMOL/L (ref 98–107)
CO2 SERPL-SCNC: 24 MMOL/L (ref 21–32)
CREAT SERPL-MCNC: 0.66 MG/DL (ref 0.5–1.05)
EGFRCR SERPLBLD CKD-EPI 2021: >90 ML/MIN/1.73M*2
EOSINOPHIL # BLD AUTO: 0.32 X10*3/UL (ref 0–0.7)
EOSINOPHIL NFR BLD AUTO: 2.5 %
ERYTHROCYTE [DISTWIDTH] IN BLOOD BY AUTOMATED COUNT: 13.8 % (ref 11.5–14.5)
GLUCOSE SERPL-MCNC: 124 MG/DL (ref 74–99)
HCT VFR BLD AUTO: 42.3 % (ref 36–46)
HGB BLD-MCNC: 13.8 G/DL (ref 12–16)
IMM GRANULOCYTES # BLD AUTO: 0.09 X10*3/UL (ref 0–0.7)
IMM GRANULOCYTES NFR BLD AUTO: 0.7 % (ref 0–0.9)
LYMPHOCYTES # BLD AUTO: 3.54 X10*3/UL (ref 1.2–4.8)
LYMPHOCYTES NFR BLD AUTO: 27.8 %
MCH RBC QN AUTO: 29.7 PG (ref 26–34)
MCHC RBC AUTO-ENTMCNC: 32.6 G/DL (ref 32–36)
MCV RBC AUTO: 91 FL (ref 80–100)
MONOCYTES # BLD AUTO: 0.74 X10*3/UL (ref 0.1–1)
MONOCYTES NFR BLD AUTO: 5.8 %
NEUTROPHILS # BLD AUTO: 7.98 X10*3/UL (ref 1.2–7.7)
NEUTROPHILS NFR BLD AUTO: 62.8 %
NRBC BLD-RTO: 0 /100 WBCS (ref 0–0)
PLATELET # BLD AUTO: 349 X10*3/UL (ref 150–450)
POTASSIUM SERPL-SCNC: 3.6 MMOL/L (ref 3.5–5.3)
PROT SERPL-MCNC: 6.5 G/DL (ref 6.4–8.2)
RBC # BLD AUTO: 4.65 X10*6/UL (ref 4–5.2)
SODIUM SERPL-SCNC: 137 MMOL/L (ref 136–145)
WBC # BLD AUTO: 12.7 X10*3/UL (ref 4.4–11.3)

## 2024-11-04 PROCEDURE — 36415 COLL VENOUS BLD VENIPUNCTURE: CPT

## 2024-11-04 PROCEDURE — 80053 COMPREHEN METABOLIC PANEL: CPT

## 2024-11-04 PROCEDURE — 85025 COMPLETE CBC W/AUTO DIFF WBC: CPT

## 2024-11-04 PROCEDURE — 83880 ASSAY OF NATRIURETIC PEPTIDE: CPT

## 2024-11-19 DIAGNOSIS — I50.32 HEART FAILURE WITH IMPROVED EJECTION FRACTION (HFIMPEF): ICD-10-CM

## 2024-11-19 DIAGNOSIS — I10 BENIGN ESSENTIAL HYPERTENSION: ICD-10-CM

## 2024-11-22 RX ORDER — SPIRONOLACTONE 25 MG/1
25 TABLET ORAL 2 TIMES DAILY
Qty: 60 TABLET | Refills: 1 | Status: SHIPPED | OUTPATIENT
Start: 2024-11-22

## 2024-11-22 NOTE — TELEPHONE ENCOUNTER
----- Message from Nurse Yari MOSLEY sent at 11/21/2024  1:44 PM EST -----  Regarding: Refill  Patient is requesting a refill of spironolactone to be sent to [facility] in [place]

## 2024-12-12 ENCOUNTER — TELEPHONE (OUTPATIENT)
Dept: CARDIOLOGY | Facility: HOSPITAL | Age: 49
End: 2024-12-12
Payer: COMMERCIAL

## 2024-12-12 NOTE — TELEPHONE ENCOUNTER
Patient called in requesting AG to call her in something for heart burn. She requested it to go to [facility] in [place]

## 2024-12-12 NOTE — TELEPHONE ENCOUNTER
RN called the number on file and it is not the patients number. RN given an Alt number to call patient. RN called and pt states she is having heartburn when she is eating and is asking for script for something to help. PT states no CP at this time. RN stated to pt she will notify Dr. Pineda. Pt verbalized understanding.

## 2024-12-13 ENCOUNTER — TELEPHONE (OUTPATIENT)
Dept: CARDIOLOGY | Facility: HOSPITAL | Age: 49
End: 2024-12-13
Payer: COMMERCIAL

## 2024-12-13 DIAGNOSIS — R14.0 ABDOMINAL BLOATING: Primary | ICD-10-CM

## 2024-12-13 RX ORDER — PANTOPRAZOLE SODIUM 40 MG/1
40 TABLET, DELAYED RELEASE ORAL
Qty: 30 TABLET | Refills: 0 | Status: SHIPPED | OUTPATIENT
Start: 2024-12-13 | End: 2025-01-12

## 2024-12-13 NOTE — TELEPHONE ENCOUNTER
RN called pt at this time regarding heart burn medication. Per Dr. Pineda at this time she can be started on protonix 40 mg. Rn sent over for approval to [facility], Pt verbalized understanding that this will only be a months worth.

## 2025-01-31 DIAGNOSIS — I10 BENIGN ESSENTIAL HYPERTENSION: ICD-10-CM

## 2025-01-31 DIAGNOSIS — I50.32 HEART FAILURE WITH IMPROVED EJECTION FRACTION (HFIMPEF): ICD-10-CM

## 2025-01-31 RX ORDER — SPIRONOLACTONE 25 MG/1
25 TABLET ORAL 2 TIMES DAILY
Qty: 180 TABLET | Refills: 1 | Status: SHIPPED | OUTPATIENT
Start: 2025-01-31

## 2025-02-27 DIAGNOSIS — I42.0 DILATED CARDIOMYOPATHY (MULTI): ICD-10-CM

## 2025-02-27 DIAGNOSIS — I50.22 CHRONIC SYSTOLIC (CONGESTIVE) HEART FAILURE: ICD-10-CM

## 2025-02-27 DIAGNOSIS — I42.9 CARDIOMYOPATHY, UNSPECIFIED TYPE (MULTI): ICD-10-CM

## 2025-02-27 RX ORDER — SACUBITRIL AND VALSARTAN 24; 26 MG/1; MG/1
1 TABLET, FILM COATED ORAL 2 TIMES DAILY
Qty: 180 TABLET | Refills: 1 | Status: SHIPPED | OUTPATIENT
Start: 2025-02-27 | End: 2025-08-26

## 2025-04-03 ENCOUNTER — OFFICE VISIT (OUTPATIENT)
Dept: CARDIOLOGY | Facility: HOSPITAL | Age: 50
End: 2025-04-03
Payer: COMMERCIAL

## 2025-04-03 VITALS
WEIGHT: 153 LBS | SYSTOLIC BLOOD PRESSURE: 128 MMHG | HEIGHT: 62 IN | HEART RATE: 87 BPM | DIASTOLIC BLOOD PRESSURE: 78 MMHG | BODY MASS INDEX: 28.16 KG/M2

## 2025-04-03 DIAGNOSIS — I50.32 HEART FAILURE WITH IMPROVED EJECTION FRACTION (HFIMPEF): ICD-10-CM

## 2025-04-03 DIAGNOSIS — I10 BENIGN ESSENTIAL HYPERTENSION: ICD-10-CM

## 2025-04-03 DIAGNOSIS — R06.02 SHORTNESS OF BREATH: ICD-10-CM

## 2025-04-03 PROCEDURE — 3008F BODY MASS INDEX DOCD: CPT | Performed by: INTERNAL MEDICINE

## 2025-04-03 PROCEDURE — 3078F DIAST BP <80 MM HG: CPT | Performed by: INTERNAL MEDICINE

## 2025-04-03 PROCEDURE — 3074F SYST BP LT 130 MM HG: CPT | Performed by: INTERNAL MEDICINE

## 2025-04-03 ASSESSMENT — ENCOUNTER SYMPTOMS
OCCASIONAL FEELINGS OF UNSTEADINESS: 0
LOSS OF SENSATION IN FEET: 0
DEPRESSION: 0

## 2025-04-14 ENCOUNTER — TELEPHONE (OUTPATIENT)
Dept: CARDIOLOGY | Facility: HOSPITAL | Age: 50
End: 2025-04-14
Payer: COMMERCIAL

## 2025-04-14 NOTE — TELEPHONE ENCOUNTER
RN called pt at this time regarding lump in throat. Pt does not have a PCP, Rn was able to help give her two number for the PCP office here in the hospital and Dr. Benjamin's office. Pt stated she is having hand swelling after stopping her lasix, pt is being seen with Edwin Flores on the 25th of this month, RN advised if she becomes short of breathe or having terrible laying flat to go to the ER for eval. Pt verbalized understanding.

## 2025-04-14 NOTE — TELEPHONE ENCOUNTER
Pt has a lump in her throat and stated she doesn't have primary request cb from nurse to see if she can schedule appt to be seen or if someone can call her to spk

## 2025-04-18 ENCOUNTER — APPOINTMENT (OUTPATIENT)
Dept: PRIMARY CARE | Facility: CLINIC | Age: 50
End: 2025-04-18
Payer: COMMERCIAL

## 2025-05-01 ENCOUNTER — APPOINTMENT (OUTPATIENT)
Dept: PRIMARY CARE | Facility: CLINIC | Age: 50
End: 2025-05-01
Payer: COMMERCIAL

## 2025-05-06 DIAGNOSIS — I10 BENIGN ESSENTIAL HYPERTENSION: ICD-10-CM

## 2025-05-06 DIAGNOSIS — I50.32 HEART FAILURE WITH IMPROVED EJECTION FRACTION (HFIMPEF): ICD-10-CM

## 2025-05-06 DIAGNOSIS — I42.9 CARDIOMYOPATHY, UNSPECIFIED TYPE (MULTI): ICD-10-CM

## 2025-05-06 DIAGNOSIS — I50.22 CHRONIC SYSTOLIC (CONGESTIVE) HEART FAILURE: ICD-10-CM

## 2025-05-06 DIAGNOSIS — I42.0 DILATED CARDIOMYOPATHY (MULTI): ICD-10-CM

## 2025-05-06 RX ORDER — CARVEDILOL 6.25 MG/1
6.25 TABLET ORAL
Qty: 180 TABLET | Refills: 3 | Status: SHIPPED | OUTPATIENT
Start: 2025-05-06 | End: 2026-05-06

## 2025-05-06 RX ORDER — FUROSEMIDE 20 MG/1
20 TABLET ORAL DAILY
Qty: 90 TABLET | Refills: 1 | Status: SHIPPED | OUTPATIENT
Start: 2025-05-06 | End: 2025-11-02

## 2025-05-07 RX ORDER — SPIRONOLACTONE 25 MG/1
25 TABLET ORAL 2 TIMES DAILY
Qty: 180 TABLET | Refills: 0 | Status: SHIPPED | OUTPATIENT
Start: 2025-05-07

## 2025-06-02 ENCOUNTER — APPOINTMENT (OUTPATIENT)
Dept: CARDIOLOGY | Facility: HOSPITAL | Age: 50
End: 2025-06-02
Payer: COMMERCIAL

## 2025-06-02 VITALS
WEIGHT: 148.6 LBS | HEIGHT: 62 IN | HEART RATE: 102 BPM | SYSTOLIC BLOOD PRESSURE: 118 MMHG | BODY MASS INDEX: 27.34 KG/M2 | DIASTOLIC BLOOD PRESSURE: 80 MMHG

## 2025-06-02 DIAGNOSIS — R22.2 CHEST WALL MASS: ICD-10-CM

## 2025-06-02 DIAGNOSIS — I10 BENIGN ESSENTIAL HYPERTENSION: ICD-10-CM

## 2025-06-02 DIAGNOSIS — R22.1 NECK MASS: ICD-10-CM

## 2025-06-02 DIAGNOSIS — I42.0 DILATED CARDIOMYOPATHY (MULTI): Primary | ICD-10-CM

## 2025-06-02 PROCEDURE — 4004F PT TOBACCO SCREEN RCVD TLK: CPT | Performed by: INTERNAL MEDICINE

## 2025-06-02 PROCEDURE — 3074F SYST BP LT 130 MM HG: CPT | Performed by: INTERNAL MEDICINE

## 2025-06-02 PROCEDURE — 99214 OFFICE O/P EST MOD 30 MIN: CPT | Performed by: INTERNAL MEDICINE

## 2025-06-02 PROCEDURE — 3008F BODY MASS INDEX DOCD: CPT | Performed by: INTERNAL MEDICINE

## 2025-06-02 PROCEDURE — 3079F DIAST BP 80-89 MM HG: CPT | Performed by: INTERNAL MEDICINE

## 2025-06-02 NOTE — PROGRESS NOTES
"Counseling:  The patient was counseled regarding diagnostic results, instructions for management, risk factor reductions, prognosis, patient and family education, impressions, risks and benefits of treatment options and importance of compliance with treatment.       Chief Complaint:   The patient presents today for 7-month followup of CMP/heart failure and HTN.      History Of Present Illness:    Zoila May is a 50 year old female patient who presents today for 7-month followup of CMP/heart failure and HTN. Her PMH is significant for systolic heart failure, anxiety, sleep apnea, depression, tobacco abuse and spinal stenosis. Over the past 7 months, the patient states that she has done well from a cardiac standpoint. She denies any CP, chest discomfort or SOB. Her only complaint is that of a palpable swelling/fullness in the area of her upper chest/neck. BP has been stable. The patient is compliant with her prescribed medications.      Last Recorded Vitals:  Vitals:    06/02/25 1446   BP: 118/80   Pulse: 102   Weight: 67.4 kg (148 lb 9.6 oz)   Height: 1.575 m (5' 2\")       Past Surgical History:  She has a past surgical history that includes Other surgical history (09/30/2015).      Social History:  She reports that she has been smoking cigarettes. She has been exposed to tobacco smoke. She has never used smokeless tobacco. No history on file for alcohol use and drug use.    Family History:  Family History[1]     Allergies:  Patient has no known allergies.    Outpatient Medications:  Current Outpatient Medications   Medication Instructions    carvedilol (COREG) 6.25 mg, oral, 2 times daily (morning and late afternoon)    empagliflozin (JARDIANCE) 25 mg, oral, Daily    furosemide (LASIX) 20 mg, oral, Daily    pantoprazole (PROTONIX) 40 mg, oral, Daily before breakfast, Do not crush, chew, or split.    sacubitriL-valsartan (Entresto) 24-26 mg tablet 1 tablet, oral, 2 times daily    spironolactone (ALDACTONE) 25 mg, " "oral, 2 times daily     Review of Systems   Musculoskeletal:         Swelling upper chest/neck   All other systems reviewed and are negative.     Physical Exam:  Constitutional:       Appearance: Healthy appearance. Not in distress.   Neck:      Vascular: No JVR. JVD normal.   Pulmonary:      Effort: Pulmonary effort is normal.      Breath sounds: Normal breath sounds. No wheezing. No rhonchi. No rales.   Chest:      Chest wall: Not tender to palpatation.   Cardiovascular:      PMI at left midclavicular line. Normal rate. Regular rhythm. Normal S1. Normal S2.       Murmurs: There is no murmur.      No gallop.  No click. No rub.   Pulses:     Intact distal pulses.   Edema:     Peripheral edema absent.   Abdominal:      General: Bowel sounds are normal.      Palpations: Abdomen is soft.      Tenderness: There is no abdominal tenderness.   Musculoskeletal: Normal range of motion.         General: No tenderness. Skin:     General: Skin is warm and dry.   Neurological:      General: No focal deficit present.      Mental Status: Alert and oriented to person, place and time.          Last Labs:  CBC -  Lab Results   Component Value Date    WBC 12.7 (H) 11/04/2024    HGB 13.8 11/04/2024    HCT 42.3 11/04/2024    MCV 91 11/04/2024     11/04/2024       CMP -  Lab Results   Component Value Date    CALCIUM 8.7 11/04/2024    PROT 6.5 11/04/2024    ALBUMIN 4.0 11/04/2024    AST 9 11/04/2024    ALT 9 11/04/2024    ALKPHOS 62 11/04/2024    BILITOT 0.3 11/04/2024       LIPID PANEL -   No results found for: \"CHOL\", \"TRIG\", \"HDL\", \"CHHDL\", \"LDLF\", \"VLDL\", \"NHDL\"    RENAL FUNCTION PANEL -   Lab Results   Component Value Date    GLUCOSE 124 (H) 11/04/2024     11/04/2024    K 3.6 11/04/2024     11/04/2024    CO2 24 11/04/2024    ANIONGAP 13 11/04/2024    BUN 8 11/04/2024    CREATININE 0.66 11/04/2024    CALCIUM 8.7 11/04/2024    ALBUMIN 4.0 11/04/2024        Lab Results   Component Value Date    BNP 17 11/04/2024 "       Last Cardiology Tests:  03/06/2024 - TTE  Left ventricular systolic function is normal with a 55-60% estimated ejection fraction.     07/11/2023 - Vascular Lab Renal Artery U/S   1. Right Renal Artery: Right renal arteries demonstrate no evidence of hemodynamically significant stenosis. The right renal vein is widely patent.  2. Left Renal Artery: Left renal arteries demonstrate no evidence of hemodynamically significant stenosis. The left renal vein is widely patent.  3. Additional Findings: Technically limited due to bowel gas and patients respirations.     10/06/2022 - TTE  Left ventricular systolic function is normal with a 60-65% estimated ejection fraction.     04/14/2022 - Cardiac Catheterization (LH/RH)  1. Non-obstructive coronary artery disease.  2. Right dominant coronary circulation.  3. Reduced CO/Cardiac index (Cynthia) with normal right and left sided filling pressures.     04/13/2022 - TTE  1. The left ventricular systolic function is severely decreased with a 25% estimated ejection fraction.  2. Spectral Doppler shows an abnormal pattern of left ventricular diastolic filling.  3. The right atrium is mild to moderately dilated.  4. Mild to moderate mitral valve regurgitation.  5. There is moderate to severe tricuspid regurgitation.  6. There is global hypokinesis of the left ventricle with minor regional variations.      Lab review: I have personally reviewed the laboratory result(s).     Assessment/Plan   1) Chronic Systolic Heart failure, Nonischemic CMP ( ? Coxsackie Virus Induced Viral Cardiomyopathy - Antibody Titers were Positive).  On carvedilol 6.25 mg BID, Jardiance 25 mg daily, Entresto 24-26 mg BID, spironolactone 25 mg daily  [facility] April 2022 with non-obstructive CAD  TTE 03/06/2024 with LVEF 55-60%  Denies CP, chest discomfort or SOB  BP stable  Continue current medical Rx   Check CBC, CMP, Lipid Panel, TSH  F/U after testing      2) HTN  Stable  On carvedilol 6.25 mg BID, Entresto  24-26 mg BID, furosemide 20 mg daily, spironolactone 25 mg daily   ED evaluation 06/25/2023 for elevated home BP of 150s/100s with associated headache  EKG showed NSR at a rate of 95 bpm with no acute changes and BP was measured at 134/92  Renal artery U/S 07/11/2023 negative  Hormonal profile drawn 07/11/2023 with elevated normetanephrine of 2.0, otherwise unremarkable   Continue current medical Rx      3) Syncope  Reviewed Holter - negative  Denies recurrent syncope     4) Tobacco Dependence  Previously discussed smoking cessation face to face and explained detrimental effects to the patients health including worsening lung disease, HTN and CAD. Educated on benefits on quitting smoking including reducing risk of CAD, lung disease, peptic ulcers, cancer and osteoporosis. Educated on available options to help in quitting including nicotine patch, Nicorette gum/lozenges, Chantix and Wellbutrin.     5) Swelling Upper Chest/Neck  Reports a palpable fullness/swelling in the area of her upper check/neck   Check CT chest  Check CT neck   Check CBC, CMP, TSH  F/U after testing       Scribe Attestation  By signing my name below, I, Ying Murillo, attest that this documentation has been prepared under the direction and in the presence of Gasper Pineda MD.          [1]   Family History  Problem Relation Name Age of Onset    Cancer Mother      Cancer Father

## 2025-06-02 NOTE — PATIENT INSTRUCTIONS
Continue all current medications as prescribed.  Please have blood work drawn.  Dr. Pineda has ordered a CT scan of your chest and neck.   Followup with Dr. Pineda after the above tests.    If you have any questions or cardiac concerns, please call our office at [phone].

## 2025-06-20 ENCOUNTER — APPOINTMENT (OUTPATIENT)
Dept: RADIOLOGY | Facility: HOSPITAL | Age: 50
End: 2025-06-20
Payer: COMMERCIAL

## 2025-06-25 DIAGNOSIS — R06.02 SHORTNESS OF BREATH: ICD-10-CM

## 2025-06-25 DIAGNOSIS — R22.1 NECK MASS: Primary | ICD-10-CM

## 2025-06-27 ENCOUNTER — TELEPHONE (OUTPATIENT)
Dept: CARDIOLOGY | Facility: HOSPITAL | Age: 50
End: 2025-06-27
Payer: COMMERCIAL

## 2025-07-08 NOTE — PROGRESS NOTES
Counseling:  The patient was counseled regarding diagnostic results, instructions for management, risk factor reductions, prognosis, patient and family education, impressions, risks and benefits of treatment options and importance of compliance with treatment.       Chief Complaint:   The patient presents today for 1-month followup of swelling of the upper chest/neck s/p CT chest/neck.     History Of Present Illness:    Zoila May is a 50 year old female patient who presents today for 1-month followup of swelling of the upper chest/neck s/p CT chest/neck. Her PMH is significant for systolic heart failure, anxiety, sleep apnea, depression, tobacco abuse and spinal stenosis. ***testing not done      Last Recorded Vitals:  There were no vitals filed for this visit.      Past Surgical History:  She has a past surgical history that includes Other surgical history (09/30/2015).      Social History:  She reports that she has been smoking cigarettes. She has been exposed to tobacco smoke. She has never used smokeless tobacco. No history on file for alcohol use and drug use.    Family History:  Family History[1]     Allergies:  Patient has no known allergies.    Outpatient Medications:  Current Outpatient Medications   Medication Instructions    carvedilol (COREG) 6.25 mg, oral, 2 times daily (morning and late afternoon)    empagliflozin (JARDIANCE) 25 mg, oral, Daily    pantoprazole (PROTONIX) 40 mg, oral, Daily before breakfast, Do not crush, chew, or split.    sacubitriL-valsartan (Entresto) 24-26 mg tablet 1 tablet, oral, 2 times daily    spironolactone (ALDACTONE) 25 mg, oral, 2 times daily     Review of Systems   Musculoskeletal:         Swelling upper chest/neck   All other systems reviewed and are negative.     Physical Exam:  Constitutional:       Appearance: Healthy appearance. Not in distress.   Neck:      Vascular: No JVR. JVD normal.   Pulmonary:      Effort: Pulmonary effort is normal.      Breath sounds:  "Normal breath sounds. No wheezing. No rhonchi. No rales.   Chest:      Chest wall: Not tender to palpatation.   Cardiovascular:      PMI at left midclavicular line. Normal rate. Regular rhythm. Normal S1. Normal S2.       Murmurs: There is no murmur.      No gallop.  No click. No rub.   Pulses:     Intact distal pulses.   Edema:     Peripheral edema absent.   Abdominal:      General: Bowel sounds are normal.      Palpations: Abdomen is soft.      Tenderness: There is no abdominal tenderness.   Musculoskeletal: Normal range of motion.         General: No tenderness. Skin:     General: Skin is warm and dry.   Neurological:      General: No focal deficit present.      Mental Status: Alert and oriented to person, place and time.          Last Labs:  CBC -  Lab Results   Component Value Date    WBC 12.7 (H) 11/04/2024    HGB 13.8 11/04/2024    HCT 42.3 11/04/2024    MCV 91 11/04/2024     11/04/2024       CMP -  Lab Results   Component Value Date    CALCIUM 8.7 11/04/2024    PROT 6.5 11/04/2024    ALBUMIN 4.0 11/04/2024    AST 9 11/04/2024    ALT 9 11/04/2024    ALKPHOS 62 11/04/2024    BILITOT 0.3 11/04/2024       LIPID PANEL -   No results found for: \"CHOL\", \"TRIG\", \"HDL\", \"CHHDL\", \"LDLF\", \"VLDL\", \"NHDL\"    RENAL FUNCTION PANEL -   Lab Results   Component Value Date    GLUCOSE 124 (H) 11/04/2024     11/04/2024    K 3.6 11/04/2024     11/04/2024    CO2 24 11/04/2024    ANIONGAP 13 11/04/2024    BUN 8 11/04/2024    CREATININE 0.66 11/04/2024    CALCIUM 8.7 11/04/2024    ALBUMIN 4.0 11/04/2024        Lab Results   Component Value Date    BNP 17 11/04/2024       Last Cardiology Tests:  03/06/2024 - TTE  Left ventricular systolic function is normal with a 55-60% estimated ejection fraction.     07/11/2023 - Vascular Lab Renal Artery U/S   1. Right Renal Artery: Right renal arteries demonstrate no evidence of hemodynamically significant stenosis. The right renal vein is widely patent.  2. Left Renal Artery: " Left renal arteries demonstrate no evidence of hemodynamically significant stenosis. The left renal vein is widely patent.  3. Additional Findings: Technically limited due to bowel gas and patients respirations.     10/06/2022 - TTE  Left ventricular systolic function is normal with a 60-65% estimated ejection fraction.     04/14/2022 - Cardiac Catheterization (LH/RH)  1. Non-obstructive coronary artery disease.  2. Right dominant coronary circulation.  3. Reduced CO/Cardiac index (Cynthia) with normal right and left sided filling pressures.     04/13/2022 - TTE  1. The left ventricular systolic function is severely decreased with a 25% estimated ejection fraction.  2. Spectral Doppler shows an abnormal pattern of left ventricular diastolic filling.  3. The right atrium is mild to moderately dilated.  4. Mild to moderate mitral valve regurgitation.  5. There is moderate to severe tricuspid regurgitation.  6. There is global hypokinesis of the left ventricle with minor regional variations.      Lab review: I have personally reviewed the laboratory result(s).     Assessment/Plan   1) Chronic Systolic Heart failure, Nonischemic CMP ( ? Coxsackie Virus Induced Viral Cardiomyopathy - Antibody Titers were Positive).  On carvedilol 6.25 mg BID, Jardiance 25 mg daily, Entresto 24-26 mg BID, spironolactone 25 mg daily  [facility] April 2022 with non-obstructive CAD  TTE 03/06/2024 with LVEF 55-60%  Denies CP, chest discomfort or SOB  BP stable  Continue current medical Rx   Check CBC, CMP, Lipid Panel, TSH - not drawn     2) HTN  Stable  On carvedilol 6.25 mg BID, Entresto 24-26 mg BID, furosemide 20 mg daily, spironolactone 25 mg daily   ED evaluation 06/25/2023 for elevated home BP of 150s/100s with associated headache  EKG showed NSR at a rate of 95 bpm with no acute changes and BP was measured at 134/92  Renal artery U/S 07/11/2023 negative  Hormonal profile drawn 07/11/2023 with elevated normetanephrine of 2.0, otherwise  unremarkable   Continue current medical Rx      3) Syncope  Reviewed Holter - negative  Denies recurrent syncope     4) Tobacco Dependence  Previously discussed smoking cessation face to face and explained detrimental effects to the patients health including worsening lung disease, HTN and CAD. Educated on benefits on quitting smoking including reducing risk of CAD, lung disease, peptic ulcers, cancer and osteoporosis. Educated on available options to help in quitting including nicotine patch, Nicorette gum/lozenges, Chantix and Wellbutrin.     5) Swelling Upper Chest/Neck  Reports a palpable fullness/swelling in the area of her upper check/neck   Check CT chest - not performed  Check CT neck - not performed  Check CBC, CMP, TSH - not drawn      Scribe Attestation  By signing my name below, I, Ying Murillo, attest that this documentation has been prepared under the direction and in the presence of Gasper Pineda MD.        [1]   Family History  Problem Relation Name Age of Onset    Cancer Mother      Cancer Father

## 2025-07-09 ENCOUNTER — TELEPHONE (OUTPATIENT)
Dept: CARDIOLOGY | Facility: HOSPITAL | Age: 50
End: 2025-07-09
Payer: COMMERCIAL

## 2025-07-09 NOTE — TELEPHONE ENCOUNTER
Kierra WEATHERS requested pt be r/s for appt with Dr. Pineda as she has not yet completed testing ordered at office visit 6/2. Pt was scheduled for CT neck and chest, however this was marked as denied by Jose. Dr. Pineda instead placed orders for chest xray and neck ultrasound. Pt agreeable to neck US 7/21 10:30am - informed pt xrays are walk-in and she may complete these while present for US. Pt also agreeable to r/s follow up with Dr. Pineda to 7/29 2:15pm.

## 2025-07-10 ENCOUNTER — APPOINTMENT (OUTPATIENT)
Dept: CARDIOLOGY | Facility: HOSPITAL | Age: 50
End: 2025-07-10
Payer: COMMERCIAL

## 2025-07-21 ENCOUNTER — HOSPITAL ENCOUNTER (OUTPATIENT)
Dept: RADIOLOGY | Facility: HOSPITAL | Age: 50
Discharge: HOME | End: 2025-07-21
Payer: COMMERCIAL

## 2025-07-21 DIAGNOSIS — R22.1 NECK MASS: ICD-10-CM

## 2025-07-21 DIAGNOSIS — R06.02 SHORTNESS OF BREATH: ICD-10-CM

## 2025-07-21 PROCEDURE — 76536 US EXAM OF HEAD AND NECK: CPT

## 2025-07-21 PROCEDURE — 76536 US EXAM OF HEAD AND NECK: CPT | Performed by: RADIOLOGY

## 2025-09-23 ENCOUNTER — APPOINTMENT (OUTPATIENT)
Dept: CARDIOLOGY | Facility: HOSPITAL | Age: 50
End: 2025-09-23
Payer: COMMERCIAL